# Patient Record
Sex: FEMALE | Race: WHITE | NOT HISPANIC OR LATINO | Employment: FULL TIME | URBAN - METROPOLITAN AREA
[De-identification: names, ages, dates, MRNs, and addresses within clinical notes are randomized per-mention and may not be internally consistent; named-entity substitution may affect disease eponyms.]

---

## 2023-03-21 LAB
ALANINE AMINOTRANSFERASE (SGPT) (U/L) IN SER/PLAS: 18 U/L (ref 7–45)
ALBUMIN (G/DL) IN SER/PLAS: 4.6 G/DL (ref 3.4–5)
ALKALINE PHOSPHATASE (U/L) IN SER/PLAS: 81 U/L (ref 33–110)
ANION GAP IN SER/PLAS: 12 MMOL/L (ref 10–20)
ASPARTATE AMINOTRANSFERASE (SGOT) (U/L) IN SER/PLAS: 18 U/L (ref 9–39)
BILIRUBIN TOTAL (MG/DL) IN SER/PLAS: 0.4 MG/DL (ref 0–1.2)
C REACTIVE PROTEIN (MG/L) IN SER/PLAS: 1.59 MG/DL
CALCIUM (MG/DL) IN SER/PLAS: 9.6 MG/DL (ref 8.6–10.3)
CARBON DIOXIDE, TOTAL (MMOL/L) IN SER/PLAS: 28 MMOL/L (ref 21–32)
CHLORIDE (MMOL/L) IN SER/PLAS: 101 MMOL/L (ref 98–107)
CREATININE (MG/DL) IN SER/PLAS: 0.72 MG/DL (ref 0.5–1.05)
ERYTHROCYTE DISTRIBUTION WIDTH (RATIO) BY AUTOMATED COUNT: 13.8 % (ref 11.5–14.5)
ERYTHROCYTE MEAN CORPUSCULAR HEMOGLOBIN CONCENTRATION (G/DL) BY AUTOMATED: 31.9 G/DL (ref 32–36)
ERYTHROCYTE MEAN CORPUSCULAR VOLUME (FL) BY AUTOMATED COUNT: 97 FL (ref 80–100)
ERYTHROCYTES (10*6/UL) IN BLOOD BY AUTOMATED COUNT: 4.32 X10E12/L (ref 4–5.2)
GFR FEMALE: >90 ML/MIN/1.73M2
GLUCOSE (MG/DL) IN SER/PLAS: 82 MG/DL (ref 74–99)
HEMATOCRIT (%) IN BLOOD BY AUTOMATED COUNT: 41.7 % (ref 36–46)
HEMOGLOBIN (G/DL) IN BLOOD: 13.3 G/DL (ref 12–16)
LEUKOCYTES (10*3/UL) IN BLOOD BY AUTOMATED COUNT: 8.5 X10E9/L (ref 4.4–11.3)
PLATELETS (10*3/UL) IN BLOOD AUTOMATED COUNT: 253 X10E9/L (ref 150–450)
POTASSIUM (MMOL/L) IN SER/PLAS: 4.2 MMOL/L (ref 3.5–5.3)
PROTEIN TOTAL: 7.2 G/DL (ref 6.4–8.2)
SEDIMENTATION RATE, ERYTHROCYTE: 28 MM/H (ref 0–30)
SODIUM (MMOL/L) IN SER/PLAS: 137 MMOL/L (ref 136–145)
UREA NITROGEN (MG/DL) IN SER/PLAS: 11 MG/DL (ref 6–23)

## 2023-03-22 LAB
HLAB27 TYPING: NORMAL
RHEUMATOID FACTOR (IU/ML) IN SERUM OR PLASMA: <10 IU/ML (ref 0–15)

## 2023-03-24 LAB — CITRULLINE ANTIBODY, IGG: 3 UNITS (ref 0–19)

## 2023-11-27 ENCOUNTER — OFFICE VISIT (OUTPATIENT)
Dept: ORTHOPEDIC SURGERY | Facility: CLINIC | Age: 58
End: 2023-11-27
Payer: MEDICAID

## 2023-11-27 DIAGNOSIS — S52.602A CLOSED FRACTURE OF DISTAL ENDS OF LEFT RADIUS AND ULNA, INITIAL ENCOUNTER: ICD-10-CM

## 2023-11-27 DIAGNOSIS — S52.502A CLOSED FRACTURE OF DISTAL ENDS OF LEFT RADIUS AND ULNA, INITIAL ENCOUNTER: ICD-10-CM

## 2023-11-27 DIAGNOSIS — G56.02 LEFT CARPAL TUNNEL SYNDROME: Primary | ICD-10-CM

## 2023-11-27 PROCEDURE — 1036F TOBACCO NON-USER: CPT | Performed by: ORTHOPAEDIC SURGERY

## 2023-11-27 PROCEDURE — 76942 ECHO GUIDE FOR BIOPSY: CPT | Performed by: ORTHOPAEDIC SURGERY

## 2023-11-27 PROCEDURE — 20526 THER INJECTION CARP TUNNEL: CPT | Performed by: ORTHOPAEDIC SURGERY

## 2023-11-27 PROCEDURE — 99203 OFFICE O/P NEW LOW 30 MIN: CPT | Performed by: ORTHOPAEDIC SURGERY

## 2023-11-27 RX ORDER — GABAPENTIN 800 MG/1
800 TABLET ORAL
COMMUNITY
Start: 2023-05-08

## 2023-11-27 RX ORDER — METOPROLOL SUCCINATE 200 MG/1
100 TABLET, EXTENDED RELEASE ORAL 2 TIMES DAILY
COMMUNITY
Start: 2023-05-08 | End: 2024-05-02

## 2023-11-27 RX ORDER — ESOMEPRAZOLE MAGNESIUM 40 MG/1
40 CAPSULE, DELAYED RELEASE ORAL
COMMUNITY

## 2023-11-27 RX ORDER — SULFASALAZINE 500 MG/1
TABLET ORAL 3 TIMES DAILY
COMMUNITY
Start: 2018-11-28 | End: 2023-12-04

## 2023-11-27 RX ORDER — CYCLOBENZAPRINE HCL 10 MG
10 TABLET ORAL
COMMUNITY
Start: 2023-04-28 | End: 2024-03-21 | Stop reason: SDUPTHER

## 2023-11-27 RX ORDER — METHYLPREDNISOLONE ACETATE 40 MG/ML
40 INJECTION, SUSPENSION INTRA-ARTICULAR; INTRALESIONAL; INTRAMUSCULAR; SOFT TISSUE ONCE
Status: SHIPPED | OUTPATIENT
Start: 2023-11-27

## 2023-11-27 RX ORDER — DULOXETIN HYDROCHLORIDE 60 MG/1
60 CAPSULE, DELAYED RELEASE ORAL
COMMUNITY
End: 2024-05-20 | Stop reason: SDUPTHER

## 2023-11-27 RX ORDER — FLUTICASONE PROPIONATE AND SALMETEROL 232; 14 UG/1; UG/1
1 POWDER, METERED RESPIRATORY (INHALATION) 2 TIMES DAILY
COMMUNITY
Start: 2023-05-16

## 2023-11-27 RX ORDER — LISINOPRIL 10 MG/1
10 TABLET ORAL
COMMUNITY
Start: 2023-04-27

## 2023-11-27 RX ORDER — FLECAINIDE ACETATE 50 MG/1
50 TABLET ORAL 2 TIMES DAILY
COMMUNITY
Start: 2023-06-16

## 2023-11-27 ASSESSMENT — ENCOUNTER SYMPTOMS
SHORTNESS OF BREATH: 0
WHEEZING: 0
ARTHRALGIAS: 1
CHILLS: 0
FATIGUE: 0
FEVER: 0

## 2023-11-27 ASSESSMENT — PAIN SCALES - GENERAL: PAINLEVEL_OUTOF10: 4

## 2023-11-27 ASSESSMENT — PAIN - FUNCTIONAL ASSESSMENT: PAIN_FUNCTIONAL_ASSESSMENT: 0-10

## 2023-11-27 NOTE — PROGRESS NOTES
Reason for Appointment  Chief Complaint   Patient presents with    Left Hand - Pain     History of Present Illness  New patient is a 58 y.o. female here today for evaluation of left hand pain. EMG at [facility] [place] shows very mild left carpal tunnel, possible mild left cubital tunnel. She fell down the stairs 4/2023 and broke her wrist and had distal radius ORIF with Dr. Clinton. Ever since the surgery, she has had numbness in the median nerve distribution and swelling around the wrist. She did have occasional numbness prior to surgery but not as severe. She also had a cervical injury and a plate placed in her neck following the fall. No radicular symptoms. She also complains of occasional dorsal hand numbness that radiates up to the elbow.     Past Medical History:   Diagnosis Date    Personal history of other mental and behavioral disorders 06/01/2016    History of depression    Personal history of other specified conditions 06/01/2016    History of headache       Past Surgical History:   Procedure Laterality Date    MR HEAD ANGIO WO IV CONTRAST  5/8/2012    MR HEAD ANGIO WO IV CONTRAST 5/8/2012 [facility]    OTHER SURGICAL HISTORY  02/04/2014    Distal Reinsertion Of Ruptured Biceps Tendon    ROTATOR CUFF REPAIR  01/31/2014    Rotator Cuff Repair       Medication Documentation Review Audit       Reviewed by Rekha Wahl MA (Medical Assistant) on 11/27/23 at 1317      Medication Order Taking? Sig Documenting Provider Last Dose Status   AirDuo Digihaler 232-14 mcg/actuation inhaler [id number] Yes Inhale 1 puff twice a day. Historical Provider, MD Taking Active   apixaban (Eliquis) 5 mg tablet [id number] Yes Take 1 tablet (5 mg) by mouth twice a day. Historical Provider, MD Taking Active   cyclobenzaprine (Flexeril) 10 mg tablet [id number] Yes Take 1 tablet (10 mg) by mouth. Historical Provider, MD Taking Active   DULoxetine (Cymbalta) 60 mg DR capsule [id number] Yes Take 1 capsule (60 mg) by mouth once daily.  Historical MD Teofilo Taking Active   esomeprazole (NexIUM) 40 mg DR capsule [id number] Yes Take 1 capsule (40 mg) by mouth once daily. Historical Provider, MD Taking Active   flecainide (Tambocor) 50 mg tablet [id number] Yes Take 1 tablet (50 mg) by mouth twice a day. Historical MD Teofilo Taking Active   gabapentin (Neurontin) 800 mg tablet [id number] Yes Take 1 tablet (800 mg) by mouth. Historical Provider, MD Taking Active   lisinopril 10 mg tablet [id number] Yes Take 1 tablet (10 mg) by mouth once daily. Historical Provider, MD Taking Active   metoprolol succinate XL (Toprol-XL) 200 mg 24 hr tablet [id number] Yes Take 0.5 tablets (100 mg) by mouth twice a day. Historical MD Teofilo Taking Active   sulfaSALAzine (Azulfidine) 500 mg tablet [id number] Yes Take by mouth 3 times a day. Historical Provider, MD Taking Active                    No Known Allergies    Review of Systems   Constitutional:  Negative for chills, fatigue and fever.   Respiratory:  Negative for shortness of breath and wheezing.    Cardiovascular:  Negative for chest pain and leg swelling.   Musculoskeletal:  Positive for arthralgias.   All other systems reviewed and are negative.    Exam   On exam, there is a well-healed cervical scar, good shoulder ROM, good deltoid function, good elbow flexion and extension strength, wrist shows some mild stiffness but good ROM, no DRUJ instability, 10 degree loss of supination on the left, decreased light touch sensation median nerve, positive Tinel's over the median nerve, good pulses, no skin changes    Assessment   Encounter Diagnoses   Name Primary?    Left carpal tunnel syndrome Yes    Closed fracture of distal ends of left radius and ulna, initial encounter      Plan   We had a long discussion about carpal tunnel treatment. She should wear a brace at night. Additionally, a carpal tunnel injection may help her symptoms and serve as a diagnostic technique. Today we discussed conservative treatment. At  this point, the patient is experiencing side: left pain and numbness that is consistent with carpal tunnel syndrome on clinical examination. We will do one cortisone injection into the left carpal tunnel region in hopes to calm their symptoms nicely. Pt understands the small risk of infection and warning signs including flare reaction. Patient will follow-up in eight weeks. We did discuss potential surgical carpal tunnel release when symptoms recur.     Procedures  After discussing the risks and benefits of the procedure we proceeded with the injection. Using ultrasound guidance we identified the median nerve and the ulnar artery, images obtained. There was some scaring around the nerve but no significant nerve swelling. We gently aspirated and sterilely injected a mixture of 30 mg of DepoMedrol and 1 cc of 1% lidocaine into the left carpal tunnel. Pt tolerated the procedure well without any adverse effects.    I, Lilly Pillai, attest that this documentation has been prepared under the direction and in the presence of Nishant Palm MD. By signing below, I, Nishant Palm MD, personally performed the services described in this documentation. All medical record entries made by the scribe were at my direction and in my presence. I have reviewed the chart and agree that the record reflects my personal performance and is accurate and complete. 11/27/23

## 2023-12-03 DIAGNOSIS — L40.50 ARTHROPATHIC PSORIASIS, UNSPECIFIED (MULTI): ICD-10-CM

## 2023-12-04 RX ORDER — SULFASALAZINE 500 MG/1
500 TABLET ORAL 2 TIMES DAILY
Qty: 180 TABLET | Refills: 1 | Status: SHIPPED | OUTPATIENT
Start: 2023-12-04

## 2024-01-29 ENCOUNTER — OFFICE VISIT (OUTPATIENT)
Dept: ORTHOPEDIC SURGERY | Facility: CLINIC | Age: 59
End: 2024-01-29
Payer: MEDICAID

## 2024-01-29 DIAGNOSIS — S52.602A CLOSED FRACTURE OF DISTAL ENDS OF LEFT RADIUS AND ULNA, INITIAL ENCOUNTER: Primary | ICD-10-CM

## 2024-01-29 DIAGNOSIS — G56.02 LEFT CARPAL TUNNEL SYNDROME: ICD-10-CM

## 2024-01-29 DIAGNOSIS — S52.502A CLOSED FRACTURE OF DISTAL ENDS OF LEFT RADIUS AND ULNA, INITIAL ENCOUNTER: Primary | ICD-10-CM

## 2024-01-29 PROCEDURE — 1036F TOBACCO NON-USER: CPT | Performed by: ORTHOPAEDIC SURGERY

## 2024-01-29 PROCEDURE — 99213 OFFICE O/P EST LOW 20 MIN: CPT | Performed by: ORTHOPAEDIC SURGERY

## 2024-01-29 ASSESSMENT — ENCOUNTER SYMPTOMS
EYE DISCHARGE: 0
FEVER: 0
COLOR CHANGE: 0
JOINT SWELLING: 1
SHORTNESS OF BREATH: 0
TROUBLE SWALLOWING: 0
WHEEZING: 0
CHILLS: 0

## 2024-01-29 ASSESSMENT — PAIN - FUNCTIONAL ASSESSMENT: PAIN_FUNCTIONAL_ASSESSMENT: 0-10

## 2024-01-29 ASSESSMENT — PAIN SCALES - GENERAL: PAINLEVEL_OUTOF10: 4

## 2024-01-29 NOTE — PROGRESS NOTES
Reason for Appointment  Continued L hand numbness    History of Present Illness  Patient is a 59 y.o. female here today for follow-up evaluation of continued left hand numbness.  He has a history of a previous ORIF of a left distal radius fracture as well as neck surgery after her fall.  She continues to have numbness in the left hand that goes up to the mid forearm.  She had a previous carpal tunnel injection that did not give her much improvement.  He has not followed up with her neck surgeon recently.  No other changes in her past medical history, allergies, or medications.    Past Medical History:   Diagnosis Date    Personal history of other mental and behavioral disorders 06/01/2016    History of depression    Personal history of other specified conditions 06/01/2016    History of headache       Past Surgical History:   Procedure Laterality Date    MR HEAD ANGIO WO IV CONTRAST  5/8/2012    MR HEAD ANGIO WO IV CONTRAST 5/8/2012 [facility]    OTHER SURGICAL HISTORY  02/04/2014    Distal Reinsertion Of Ruptured Biceps Tendon    ROTATOR CUFF REPAIR  01/31/2014    Rotator Cuff Repair       Medication Documentation Review Audit       Reviewed by Tanya Chatman PA-C (Physician Assistant) on 01/29/24 at 1427      Medication Order Taking? Sig Documenting Provider Last Dose Status   AirDuo Digihaler 232-14 mcg/actuation inhaler [id number] Yes Inhale 1 puff twice a day. Historical Provider, MD Taking Active   apixaban (Eliquis) 5 mg tablet [id number] Yes Take 1 tablet (5 mg) by mouth twice a day. Historical Provider, MD Taking Active   cyclobenzaprine (Flexeril) 10 mg tablet [id number] Yes Take 1 tablet (10 mg) by mouth. Historical Provider, MD Taking Active   DULoxetine (Cymbalta) 60 mg DR capsule [id number] Yes Take 1 capsule (60 mg) by mouth once daily. Historical Provider, MD Taking Active   esomeprazole (NexIUM) 40 mg DR capsule [id number] Yes Take 1 capsule (40 mg) by mouth once daily. Historical Provider  MD Taking Active   flecainide (Tambocor) 50 mg tablet [id number] Yes Take 1 tablet (50 mg) by mouth twice a day. Historical Provider, MD Taking Active   gabapentin (Neurontin) 800 mg tablet [id number] Yes Take 1 tablet (800 mg) by mouth. Historical Provider, MD Taking Active   lisinopril 10 mg tablet [id number] Yes Take 1 tablet (10 mg) by mouth once daily. Historical Provider, MD Taking Active   methylPREDNISolone acetate (DEPO-Medrol) injection 40 mg [id number]   Nishant Palm MD  Active   metoprolol succinate XL (Toprol-XL) 200 mg 24 hr tablet [id number] Yes Take 0.5 tablets (100 mg) by mouth twice a day. Historical Provider, MD Taking Active   sulfaSALAzine (Azulfidine) 500 mg tablet [id number] Yes TAKE 1 TABLET BY MOUTH TWICE DAILY Ivett Mcbride MD Taking Active                    No Known Allergies    Review of Systems   Constitutional:  Negative for chills and fever.   HENT:  Negative for postnasal drip and trouble swallowing.    Eyes:  Negative for discharge.   Respiratory:  Negative for shortness of breath and wheezing.    Cardiovascular:  Negative for chest pain.   Musculoskeletal:  Positive for joint swelling.   Skin:  Negative for color change and pallor.   All other systems reviewed and are negative.    Exam   On exam the left hand shows a well-healed scar, she does have some adhesions but no severe atrophy.  Good wrist range of motion, no instability of the wrist or crepitation.  Negative Tinel's over the left median nerve.  Decree sensation to light touch throughout the left hand.  Good pulses and capillary refill in the upper extremity.    Assessment   Left carpal tunnel  Left distal radius fracture    Plan   She did not see much improvement in terms of numbness and tingling in the hand after a carpal tunnel injection she does understand this would be the best prognostic indicator that surgical release would help her.  Previous EMG has shown very mild left carpal tunnel syndrome and likely most of  the numbness in the hand is coming from her neck.  Did discuss a possible carpal tunnel release in the future but there is no rush for this.  She should follow-up with her neck surgeon for further evaluation of her neck.  She can follow-up with us at any point if she would like to proceed with a left carpal tunnel release.    Written by Tanya Chatman PA-C

## 2024-02-07 ENCOUNTER — TELEPHONE (OUTPATIENT)
Dept: RHEUMATOLOGY | Facility: CLINIC | Age: 59
End: 2024-02-07
Payer: MEDICAID

## 2024-02-08 NOTE — TELEPHONE ENCOUNTER
Finished paperwork.  We had not seen her in almost a year and was unaware that she was no longer working.  She states that she has not been working since 4/14/2023.  At that time she was taking regular days off of work as a .

## 2024-02-13 ENCOUNTER — APPOINTMENT (OUTPATIENT)
Dept: CARDIOLOGY | Facility: CLINIC | Age: 59
End: 2024-02-13
Payer: MEDICAID

## 2024-03-05 ENCOUNTER — OFFICE VISIT (OUTPATIENT)
Dept: CARDIOLOGY | Facility: CLINIC | Age: 59
End: 2024-03-05
Payer: MEDICAID

## 2024-03-05 VITALS
BODY MASS INDEX: 36.16 KG/M2 | OXYGEN SATURATION: 98 % | HEIGHT: 66 IN | WEIGHT: 225 LBS | DIASTOLIC BLOOD PRESSURE: 77 MMHG | HEART RATE: 75 BPM | SYSTOLIC BLOOD PRESSURE: 111 MMHG

## 2024-03-05 DIAGNOSIS — I48.91 ATRIAL FIBRILLATION, UNSPECIFIED TYPE (MULTI): ICD-10-CM

## 2024-03-05 DIAGNOSIS — R06.02 SHORTNESS OF BREATH: Primary | ICD-10-CM

## 2024-03-05 PROCEDURE — 93010 ELECTROCARDIOGRAM REPORT: CPT | Performed by: INTERNAL MEDICINE

## 2024-03-05 PROCEDURE — 99213 OFFICE O/P EST LOW 20 MIN: CPT | Performed by: HOSPITALIST

## 2024-03-05 PROCEDURE — 1036F TOBACCO NON-USER: CPT | Performed by: HOSPITALIST

## 2024-03-05 PROCEDURE — 93005 ELECTROCARDIOGRAM TRACING: CPT | Performed by: HOSPITALIST

## 2024-03-05 ASSESSMENT — COLUMBIA-SUICIDE SEVERITY RATING SCALE - C-SSRS
1. IN THE PAST MONTH, HAVE YOU WISHED YOU WERE DEAD OR WISHED YOU COULD GO TO SLEEP AND NOT WAKE UP?: NO
6. HAVE YOU EVER DONE ANYTHING, STARTED TO DO ANYTHING, OR PREPARED TO DO ANYTHING TO END YOUR LIFE?: NO
2. HAVE YOU ACTUALLY HAD ANY THOUGHTS OF KILLING YOURSELF?: NO

## 2024-03-05 ASSESSMENT — PATIENT HEALTH QUESTIONNAIRE - PHQ9
2. FEELING DOWN, DEPRESSED OR HOPELESS: NOT AT ALL
1. LITTLE INTEREST OR PLEASURE IN DOING THINGS: NOT AT ALL
SUM OF ALL RESPONSES TO PHQ9 QUESTIONS 1 AND 2: 0

## 2024-03-05 ASSESSMENT — ENCOUNTER SYMPTOMS
DEPRESSION: 0
OCCASIONAL FEELINGS OF UNSTEADINESS: 0
LOSS OF SENSATION IN FEET: 0

## 2024-03-05 ASSESSMENT — LIFESTYLE VARIABLES
HOW OFTEN DO YOU HAVE A DRINK CONTAINING ALCOHOL: 2-4 TIMES A MONTH
HOW MANY STANDARD DRINKS CONTAINING ALCOHOL DO YOU HAVE ON A TYPICAL DAY: 1 OR 2
HOW OFTEN DO YOU HAVE SIX OR MORE DRINKS ON ONE OCCASION: NEVER
AUDIT-C TOTAL SCORE: 2
SKIP TO QUESTIONS 9-10: 1

## 2024-03-05 NOTE — PROGRESS NOTES
Subjective   Franchesca Chang is a 59 y.o. female with PMH of A. fib, KALEB on CPAP, asthma on Dulera, HLD, GERD, fibromyalgia, and MDD, who is here for routine follow-up.  Patient has not scheduled the nuclear stress test as discussed last visit.  She still complains of dyspnea exertion climbing 1 flight of stairs, has not tried to see if her inhalers would help with her asthma.  She denies any chest pressure, orthopnea, or PND.  She complains of occasional lightheadedness and vertigo turning her head or changing position quickly.  She also complains of occasional brief palpitations for a few minutes. Patient is on flecainide 100 mg once daily instead of 50 twice daily, she was told by her old EP physician that is okay to take it night as she was getting severely dizzy when she takes in the morning.     EKG from today's 3/5/2024 with NSR, poor R wave progression, early repull changes, QRS of 94 and QTc 421 ms.    EKG from 7/11/2023 with normal sinus rhythm, AK of 172, QRS of 100 from 104 before, and QTc of 440 msec [normal EKG]. Last blood work in 3/21/2023 with normal creatinine 0.72 and normal CBC.     Echocardiogram from 4/7/2021 showed LVEF of 65-70% and mild plaque in the ascending order.    Duplex ultrasound from 11/22/2023 no DVT of the right leg between CFV and popliteal.    Review of Systems  ROS is negative other than in HPI.      Objective   Physical Exam  General: Obese, NAD  HEENT: IEOM, PERRL   Neck: No JVD or carotid bruit  Lungs: CTAB  Heart: RRR, normal S1 and S2, no loud murmurs  Abdomen: Soft, nontender, positive bowel sounds  Extremities: No edema  Neurologic: No FND  Psychiatric: Normal mood and affect    Assessment/Plan   1-AF:  -Paroxysmal, last echocardiogram from 2021 was normal LA size and LVEF, CHADS2-VASc is 2 (sex and PAD).  -EKG from today 3/5/2024 with NSR, poor R wave progression, early repull changes, QRS of 94 and QTc 421 ms.  -continue metoprolol succinate 100 twice daily, flecainide  50 mg daily for now, and Eliquis 5 twice daily. Note: Patient was told by her old EP physician is okay to take 100 mg of flecainide at nighttime instead of 50 twice daily due to severe dizziness with morning dose.  -Nuclear stress test was re-ordered to rule out structural heart disease as patient is on flecainide.      2-dyspnea on exertion:  -No signs or symptoms of heart failure.  -Likely secondary to body habitus, deconditioning, and her asthma.  Will order stress test as above to rule out obstructive CAD.  -Patient was advised to lose weight.     3-other medical problems as per PCP.    Clayton Boykin MD       ADDENDUM:  ----------------  Nuclear perfusion stress test from 8/2/2024:  . Negative myocardial perfusion study without evidence of inducible myocardial ischemia or prior infarction.   2. The left ventricle is normal in size.   3. Normal LV wall motion with a post-stress LV EF estimated greater than 65%.     Clayton Boykin MD

## 2024-03-05 NOTE — PATIENT INSTRUCTIONS
Thank you for visiting us today.    Please make sure you schedule your stress test.    Will get an EKG today to make sure you do not have any bad side effects of the flecainide.  Please continue continue all your medication including Eliquis.

## 2024-03-09 LAB
ATRIAL RATE: 74 BPM
P AXIS: 35 DEGREES
P OFFSET: 182 MS
P ONSET: 126 MS
PR INTERVAL: 176 MS
Q ONSET: 214 MS
QRS COUNT: 13 BEATS
QRS DURATION: 94 MS
QT INTERVAL: 380 MS
QTC CALCULATION(BAZETT): 421 MS
QTC FREDERICIA: 407 MS
R AXIS: 58 DEGREES
T AXIS: 70 DEGREES
T OFFSET: 404 MS
VENTRICULAR RATE: 74 BPM

## 2024-03-21 DIAGNOSIS — M79.7 FIBROMYALGIA: Primary | ICD-10-CM

## 2024-03-21 RX ORDER — CYCLOBENZAPRINE HCL 10 MG
10 TABLET ORAL NIGHTLY
Qty: 90 TABLET | Refills: 0 | Status: SHIPPED | OUTPATIENT
Start: 2024-03-21 | End: 2024-06-19

## 2024-04-15 ENCOUNTER — APPOINTMENT (OUTPATIENT)
Dept: RADIOLOGY | Facility: CLINIC | Age: 59
End: 2024-04-15
Payer: MEDICAID

## 2024-04-15 ENCOUNTER — HOSPITAL ENCOUNTER (OUTPATIENT)
Dept: RADIOLOGY | Facility: CLINIC | Age: 59
End: 2024-04-15
Payer: MEDICAID

## 2024-04-15 ENCOUNTER — APPOINTMENT (OUTPATIENT)
Dept: CARDIOLOGY | Facility: CLINIC | Age: 59
End: 2024-04-15
Payer: MEDICAID

## 2024-04-15 DIAGNOSIS — M79.7 FIBROMYALGIA: Primary | ICD-10-CM

## 2024-04-15 RX ORDER — GABAPENTIN 400 MG/1
400 CAPSULE ORAL DAILY
COMMUNITY
Start: 2024-03-17 | End: 2024-04-15 | Stop reason: SDUPTHER

## 2024-04-16 RX ORDER — GABAPENTIN 400 MG/1
400 CAPSULE ORAL DAILY
Qty: 90 CAPSULE | Refills: 0 | Status: SHIPPED | OUTPATIENT
Start: 2024-04-16 | End: 2024-07-15

## 2024-05-20 DIAGNOSIS — M79.7 FIBROMYALGIA: Primary | ICD-10-CM

## 2024-05-20 DIAGNOSIS — I48.91 ATRIAL FIBRILLATION, UNSPECIFIED TYPE (MULTI): Primary | ICD-10-CM

## 2024-05-20 NOTE — TELEPHONE ENCOUNTER
Received My Chart message that patient in need of a refill. LOV 3/2024, note reviewed. Script cued and forwarded to Dr. Boykin to send

## 2024-05-21 RX ORDER — DULOXETIN HYDROCHLORIDE 60 MG/1
60 CAPSULE, DELAYED RELEASE ORAL
Qty: 30 CAPSULE | Refills: 0 | Status: SHIPPED | OUTPATIENT
Start: 2024-05-21 | End: 2025-05-21

## 2024-05-27 DIAGNOSIS — L40.50 ARTHROPATHIC PSORIASIS, UNSPECIFIED (MULTI): ICD-10-CM

## 2024-05-28 RX ORDER — SULFASALAZINE 500 MG/1
500 TABLET ORAL 2 TIMES DAILY
Qty: 180 TABLET | Refills: 1 | OUTPATIENT
Start: 2024-05-28

## 2024-05-29 NOTE — TELEPHONE ENCOUNTER
Called and left message for patient letting her know that we need to schedule an appointment in order for her to get a refill on medication.   2.19

## 2024-07-11 DIAGNOSIS — I48.91 ATRIAL FIBRILLATION, UNSPECIFIED TYPE (MULTI): Primary | ICD-10-CM

## 2024-07-11 NOTE — TELEPHONE ENCOUNTER
Patient sent My Chart message to request flecainide refill. Made aware that stress test is a requirement for refill.  Stress test scheduled for Aug 2. Will cue 30 day supply to allow for completion of stress test. Forward for signature

## 2024-07-13 RX ORDER — FLECAINIDE ACETATE 50 MG/1
50 TABLET ORAL 2 TIMES DAILY
Qty: 60 TABLET | Refills: 0 | Status: SHIPPED | OUTPATIENT
Start: 2024-07-13 | End: 2024-08-12

## 2024-08-02 ENCOUNTER — HOSPITAL ENCOUNTER (OUTPATIENT)
Dept: RADIOLOGY | Facility: HOSPITAL | Age: 59
Discharge: HOME | End: 2024-08-02
Payer: MEDICAID

## 2024-08-02 ENCOUNTER — HOSPITAL ENCOUNTER (OUTPATIENT)
Dept: CARDIOLOGY | Facility: HOSPITAL | Age: 59
Discharge: HOME | End: 2024-08-02
Payer: MEDICAID

## 2024-08-02 DIAGNOSIS — R06.02 SHORTNESS OF BREATH: ICD-10-CM

## 2024-08-02 PROCEDURE — 78452 HT MUSCLE IMAGE SPECT MULT: CPT

## 2024-08-02 PROCEDURE — 2500000004 HC RX 250 GENERAL PHARMACY W/ HCPCS (ALT 636 FOR OP/ED): Performed by: HOSPITALIST

## 2024-08-02 PROCEDURE — A9502 TC99M TETROFOSMIN: HCPCS | Performed by: HOSPITALIST

## 2024-08-02 PROCEDURE — 3430000001 HC RX 343 DIAGNOSTIC RADIOPHARMACEUTICALS: Performed by: HOSPITALIST

## 2024-08-02 PROCEDURE — 93018 CV STRESS TEST I&R ONLY: CPT | Performed by: INTERNAL MEDICINE

## 2024-08-02 PROCEDURE — 93017 CV STRESS TEST TRACING ONLY: CPT

## 2024-08-02 PROCEDURE — 93016 CV STRESS TEST SUPVJ ONLY: CPT | Performed by: INTERNAL MEDICINE

## 2024-08-02 RX ORDER — REGADENOSON 0.08 MG/ML
0.4 INJECTION, SOLUTION INTRAVENOUS ONCE
Status: COMPLETED | OUTPATIENT
Start: 2024-08-02 | End: 2024-08-02

## 2024-08-09 ENCOUNTER — TELEPHONE (OUTPATIENT)
Dept: CARDIOLOGY | Facility: CLINIC | Age: 59
End: 2024-08-09
Payer: MEDICAID

## 2024-08-09 DIAGNOSIS — I48.91 ATRIAL FIBRILLATION, UNSPECIFIED TYPE (MULTI): ICD-10-CM

## 2024-08-09 RX ORDER — FLECAINIDE ACETATE 50 MG/1
50 TABLET ORAL 2 TIMES DAILY
Qty: 180 TABLET | Refills: 3 | Status: SHIPPED | OUTPATIENT
Start: 2024-08-09 | End: 2025-08-09

## 2024-08-09 NOTE — TELEPHONE ENCOUNTER
Stress test results reviewed by Dr. Boykin. OK to continue flecainide therapy. TCT patient and results reviewed. Also confirmed that she is in need of a refill and pharmacy information is accurate. Refill cued and forwarded to Dr. Boykin to send. States understanding and agreement

## 2025-03-10 DIAGNOSIS — I48.91 ATRIAL FIBRILLATION, UNSPECIFIED TYPE (MULTI): ICD-10-CM

## 2025-03-10 RX ORDER — APIXABAN 5 MG/1
5 TABLET, FILM COATED ORAL 2 TIMES DAILY
Qty: 180 TABLET | Refills: 0 | Status: SHIPPED | OUTPATIENT
Start: 2025-03-10

## 2025-03-10 NOTE — TELEPHONE ENCOUNTER
LOV 3/2024. Script cued and message sent to patient regarding scheduling FUV in next 3 months for future refills

## 2025-08-05 DIAGNOSIS — I48.91 ATRIAL FIBRILLATION, UNSPECIFIED TYPE (MULTI): ICD-10-CM

## 2025-08-08 NOTE — TELEPHONE ENCOUNTER
My chart message sent to explain that she is overdue for office visit. Has since scheduled FUV for mid Sept. Will cue 90 day supply to get to that visit. No refills until seen in clinic

## 2025-08-09 RX ORDER — FLECAINIDE ACETATE 50 MG/1
50 TABLET ORAL 2 TIMES DAILY
Qty: 180 TABLET | Refills: 0 | Status: SHIPPED | OUTPATIENT
Start: 2025-08-09

## 2025-09-16 ENCOUNTER — APPOINTMENT (OUTPATIENT)
Dept: CARDIOLOGY | Facility: CLINIC | Age: 60
End: 2025-09-16
Payer: MEDICAID